# Patient Record
Sex: MALE | Race: WHITE | NOT HISPANIC OR LATINO | Employment: FULL TIME | URBAN - METROPOLITAN AREA
[De-identification: names, ages, dates, MRNs, and addresses within clinical notes are randomized per-mention and may not be internally consistent; named-entity substitution may affect disease eponyms.]

---

## 2017-01-10 ENCOUNTER — OFFICE VISIT (OUTPATIENT)
Dept: FAMILY MEDICINE CLINIC | Facility: CLINIC | Age: 72
End: 2017-01-10

## 2017-01-10 ENCOUNTER — HOSPITAL ENCOUNTER (OUTPATIENT)
Dept: GENERAL RADIOLOGY | Facility: HOSPITAL | Age: 72
Discharge: HOME | End: 2017-01-10
Admitting: INTERNAL MEDICINE

## 2017-01-10 VITALS
RESPIRATION RATE: 18 BRPM | DIASTOLIC BLOOD PRESSURE: 80 MMHG | WEIGHT: 233 LBS | HEART RATE: 66 BPM | BODY MASS INDEX: 32.62 KG/M2 | SYSTOLIC BLOOD PRESSURE: 156 MMHG | HEIGHT: 71 IN

## 2017-01-10 DIAGNOSIS — R03.0 ELEVATED BLOOD PRESSURE READING WITHOUT DIAGNOSIS OF HYPERTENSION: ICD-10-CM

## 2017-01-10 DIAGNOSIS — G89.29 CHRONIC PAIN OF RIGHT HIP: ICD-10-CM

## 2017-01-10 DIAGNOSIS — M25.551 CHRONIC PAIN OF RIGHT HIP: Primary | ICD-10-CM

## 2017-01-10 DIAGNOSIS — G89.29 CHRONIC PAIN OF RIGHT HIP: Primary | ICD-10-CM

## 2017-01-10 DIAGNOSIS — M25.551 CHRONIC PAIN OF RIGHT HIP: ICD-10-CM

## 2017-01-10 PROBLEM — R53.83 FATIGUE: Status: ACTIVE | Noted: 2017-01-10

## 2017-01-10 PROBLEM — N52.9 IMPOTENCE OF ORGANIC ORIGIN: Status: ACTIVE | Noted: 2017-01-10

## 2017-01-10 PROCEDURE — 99203 OFFICE O/P NEW LOW 30 MIN: CPT | Performed by: INTERNAL MEDICINE

## 2017-01-10 PROCEDURE — 73502 X-RAY EXAM HIP UNI 2-3 VIEWS: CPT

## 2017-01-10 NOTE — MR AVS SNAPSHOT
"Yasir Sheikh   1/10/2017 2:00 PM   Office Visit    Dept Phone:  [phone]   Encounter #:  [id number]    Provider:  Han Cook MD   Department:  [facility]                Your Full Care Plan              Your Updated Medication List      Notice  As of 1/10/2017  2:25 PM    You have not been prescribed any medications.            We Performed the Following     XR Hip 1 View Bilateral       You Were Diagnosed With        Codes Comments    Chronic pain of right hip    -  Primary ICD-10-CM: M25.551, G89.29  ICD-9-CM: 719.45, 338.29     Elevated blood pressure reading without diagnosis of hypertension     ICD-10-CM: R03.0  ICD-9-CM: 796.2       Instructions     None    Patient Instructions History      Upcoming Appointments     Visit Type Date Time Department    NEW PATIENT 1/10/2017  2:00 PM [facility]      [facility] Signup     Our records indicate that you have declined [facility] signup. If you would like to sign up for [employer], please email [email or web address] or call [phone] to obtain an activation code.             Other Info from Your Visit           Allergies     No Known Allergies      Reason for Visit     Hip Pain right hip x 2 years       Vital Signs     Blood Pressure Pulse Respirations Height Weight Body Mass Index    156/80 66 18 71\" (180.3 cm) 233 lb (106 kg) 32.5 kg/m2    Smoking Status                   Former Smoker           Problems and Diagnoses Noted     Chronic pain of right hip    Colon polyp    Elevated blood pressure    Tiredness    High cholesterol    Failure of erection    Other shoulder lesions, unspecified shoulder        "

## 2017-01-10 NOTE — PATIENT INSTRUCTIONS
We'll obtain a right hip x-ray.  He need to monitor your blood pressure correctly and record the results.  Schedule fasting labs including a CBC, CMP, lipid, TSH.  Also suggest vascular screening tests.  Follow-up in one month ago over the results.

## 2017-01-10 NOTE — PROGRESS NOTES
Subjective   Yasir Hector is a 71 y.o. male. Patient is here today for   Chief Complaint   Patient presents with   • Hip Pain     right hip x 2 years           Vitals:    01/10/17 1344   BP: 156/80   Pulse: 66   Resp: 18     The following portions of the patient's history were reviewed and updated as appropriate: allergies, current medications, past family history, past medical history, past social history, past surgical history and problem list.    History reviewed. No pertinent past medical history.   No Known Allergies   Social History     Social History   • Marital status:      Spouse name: N/A   • Number of children: N/A   • Years of education: N/A     Occupational History   • Not on file.     Social History Main Topics   • Smoking status: Former Smoker     Types: Cigars   • Smokeless tobacco: Not on file   • Alcohol use Yes   • Drug use: Not on file   • Sexual activity: Not on file     Other Topics Concern   • Not on file     Social History Narrative   • No narrative on file      No current outpatient prescriptions on file.     Objective     History of Present Illness Yasir is here today as a new patient.  He complains of chronic right hip pain that flares up when he walks for a period time.  He stops for a while and goes away and he resumes his walk.  He walks up 12 flights a step at work and does not have any hip pain.  He does not take any medicine for the pain.  He does not have pain at night.  His previous physician was in [facility].  Review of records reveals that he has hyperlipidemia that is not being treated.  His last lab work was in 2012 and does not include a cholesterol level.  He also has a history of colon polyp.  He checks his blood pressure at home every couple weeks and states his systolic blood pressures in the 120s.    Review of Systems   Constitutional: Negative for activity change and unexpected weight change.   Cardiovascular:        States BP is normal at home    Gastrointestinal:        Hx colon polyps   Musculoskeletal:        As in history of present illness   Neurological: Negative.    Psychiatric/Behavioral: Negative.        Physical Exam   Constitutional: He appears well-developed and well-nourished.   Neck: Neck supple. Carotid bruit is not present.   Cardiovascular: Normal rate and regular rhythm.  Frequent extrasystoles are present.   Pulmonary/Chest: Effort normal and breath sounds normal.   Musculoskeletal:   Right hip has normal range of motion.  There is slight point tenderness   Neurological: He is alert.   Psychiatric: He has a normal mood and affect.   Vitals reviewed.      ASSESSMENT     Problem List Items Addressed This Visit        Nervous and Auditory    Chronic pain of right hip - Primary    Relevant Orders    XR Hip With or Without Pelvis 2 - 3 View Right       Other    Elevated blood pressure reading without diagnosis of hypertension          PLAN  Patient Instructions   We'll obtain a right hip x-ray.  He need to monitor your blood pressure correctly and record the results.  Schedule fasting labs including a CBC, CMP, lipid, TSH.  Also suggest vascular screening tests.  Follow-up in one month ago over the results.    Return in about 1 month (around 2/10/2017) for labsCBC,CMP,LIPID,TSH.

## 2017-01-13 ENCOUNTER — TRANSCRIBE ORDERS (OUTPATIENT)
Dept: ADMINISTRATIVE | Facility: HOSPITAL | Age: 72
End: 2017-01-13

## 2017-01-13 ENCOUNTER — TELEPHONE (OUTPATIENT)
Dept: FAMILY MEDICINE CLINIC | Facility: CLINIC | Age: 72
End: 2017-01-13

## 2017-01-13 DIAGNOSIS — Z13.9 SCREENING: Primary | ICD-10-CM

## 2017-01-13 NOTE — TELEPHONE ENCOUNTER
I called patient and notified him, per Dr. Hernández, that his X-Rays just showed some arthritis in both hips.   Patient understood and was thankful for Dr. Cook's care.

## 2017-01-17 ENCOUNTER — HOSPITAL ENCOUNTER (OUTPATIENT)
Dept: CARDIOLOGY | Facility: HOSPITAL | Age: 72
Discharge: HOME | End: 2017-01-17
Admitting: INTERNAL MEDICINE

## 2017-01-17 VITALS
DIASTOLIC BLOOD PRESSURE: 87 MMHG | HEIGHT: 70 IN | BODY MASS INDEX: 32.93 KG/M2 | HEART RATE: 56 BPM | SYSTOLIC BLOOD PRESSURE: 136 MMHG | WEIGHT: 230 LBS

## 2017-01-17 DIAGNOSIS — Z13.9 SCREENING: ICD-10-CM

## 2017-01-17 LAB
BH CV ECHO MEAS - DIST AO DIAM: 1.39 CM
BH CV VAS BP LEFT ARM: NORMAL MMHG
BH CV VAS BP RIGHT ARM: NORMAL MMHG
BH CV XLRA MEAS - MID AO DIAM: 1.69 CM
BH CV XLRA MEAS - PAD LEFT ABI DP: 1.05
BH CV XLRA MEAS - PAD LEFT ABI PT: 1.04
BH CV XLRA MEAS - PAD LEFT ARM: 133 MMHG
BH CV XLRA MEAS - PAD LEFT LEG DP: 144 MMHG
BH CV XLRA MEAS - PAD LEFT LEG PT: 142 MMHG
BH CV XLRA MEAS - PAD RIGHT ABI DP: 1.04
BH CV XLRA MEAS - PAD RIGHT ABI PT: 1.02
BH CV XLRA MEAS - PAD RIGHT ARM: 136 MMHG
BH CV XLRA MEAS - PAD RIGHT LEG DP: 142 MMHG
BH CV XLRA MEAS - PAD RIGHT LEG PT: 140 MMHG
BH CV XLRA MEAS - PROX AO DIAM: 1.85 CM
BH CV XLRA MEAS LEFT ICA/CCA RATIO: 1.44
BH CV XLRA MEAS LEFT MID CCA PSV: NORMAL CM/SEC
BH CV XLRA MEAS LEFT MID ICA PSV: NORMAL CM/SEC
BH CV XLRA MEAS LEFT PROX ECA PSV: NORMAL CM/SEC
BH CV XLRA MEAS RIGHT ICA/CCA RATIO: 1.77
BH CV XLRA MEAS RIGHT MID CCA PSV: NORMAL CM/SEC
BH CV XLRA MEAS RIGHT MID ICA PSV: NORMAL CM/SEC
BH CV XLRA MEAS RIGHT PROX ECA PSV: NORMAL CM/SEC

## 2017-01-17 PROCEDURE — 93799 UNLISTED CV SVC/PROCEDURE: CPT

## 2017-02-09 DIAGNOSIS — E78.00 HYPERCHOLESTEROLEMIA: Primary | ICD-10-CM

## 2017-02-09 DIAGNOSIS — I10 ESSENTIAL HYPERTENSION: ICD-10-CM

## 2017-02-14 LAB
ALBUMIN SERPL-MCNC: 4.6 G/DL (ref 3.5–5.2)
ALBUMIN/GLOB SERPL: 1.9 G/DL
ALP SERPL-CCNC: 63 U/L (ref 39–117)
ALT SERPL-CCNC: 30 U/L (ref 1–41)
AST SERPL-CCNC: 24 U/L (ref 1–40)
BASOPHILS # BLD AUTO: 0.02 10*3/MM3 (ref 0–0.2)
BASOPHILS NFR BLD AUTO: 0.3 % (ref 0–1.5)
BILIRUB SERPL-MCNC: 0.7 MG/DL (ref 0.1–1.2)
BUN SERPL-MCNC: 15 MG/DL (ref 8–23)
BUN/CREAT SERPL: 14.9 (ref 7–25)
CALCIUM SERPL-MCNC: 9.7 MG/DL (ref 8.6–10.5)
CHLORIDE SERPL-SCNC: 101 MMOL/L (ref 98–107)
CHOLEST SERPL-MCNC: 250 MG/DL (ref 0–200)
CO2 SERPL-SCNC: 28.9 MMOL/L (ref 22–29)
CREAT SERPL-MCNC: 1.01 MG/DL (ref 0.76–1.27)
EOSINOPHIL # BLD AUTO: 0.29 10*3/MM3 (ref 0–0.7)
EOSINOPHIL NFR BLD AUTO: 4.6 % (ref 0.3–6.2)
ERYTHROCYTE [DISTWIDTH] IN BLOOD BY AUTOMATED COUNT: 14.1 % (ref 11.5–14.5)
GLOBULIN SER CALC-MCNC: 2.4 GM/DL
GLUCOSE SERPL-MCNC: 118 MG/DL (ref 65–99)
HCT VFR BLD AUTO: 45.3 % (ref 40.4–52.2)
HDLC SERPL-MCNC: 67 MG/DL (ref 40–60)
HGB BLD-MCNC: 14.8 G/DL (ref 13.7–17.6)
IMM GRANULOCYTES # BLD: 0 10*3/MM3 (ref 0–0.03)
IMM GRANULOCYTES NFR BLD: 0 % (ref 0–0.5)
LDLC SERPL CALC-MCNC: 164 MG/DL (ref 0–100)
LDLC/HDLC SERPL: 2.45 {RATIO}
LYMPHOCYTES # BLD AUTO: 2.54 10*3/MM3 (ref 0.9–4.8)
LYMPHOCYTES NFR BLD AUTO: 40.7 % (ref 19.6–45.3)
MCH RBC QN AUTO: 31.8 PG (ref 27–32.7)
MCHC RBC AUTO-ENTMCNC: 32.7 G/DL (ref 32.6–36.4)
MCV RBC AUTO: 97.2 FL (ref 79.8–96.2)
MONOCYTES # BLD AUTO: 0.58 10*3/MM3 (ref 0.2–1.2)
MONOCYTES NFR BLD AUTO: 9.3 % (ref 5–12)
NEUTROPHILS # BLD AUTO: 2.81 10*3/MM3 (ref 1.9–8.1)
NEUTROPHILS NFR BLD AUTO: 45.1 % (ref 42.7–76)
PLATELET # BLD AUTO: 190 10*3/MM3 (ref 140–500)
POTASSIUM SERPL-SCNC: 5.2 MMOL/L (ref 3.5–5.2)
PROT SERPL-MCNC: 7 G/DL (ref 6–8.5)
RBC # BLD AUTO: 4.66 10*6/MM3 (ref 4.6–6)
SODIUM SERPL-SCNC: 142 MMOL/L (ref 136–145)
TRIGL SERPL-MCNC: 93 MG/DL (ref 0–150)
TSH SERPL DL<=0.005 MIU/L-ACNC: 3.04 MIU/ML (ref 0.27–4.2)
VLDLC SERPL CALC-MCNC: 18.6 MG/DL (ref 5–40)
WBC # BLD AUTO: 6.24 10*3/MM3 (ref 4.5–10.7)

## 2017-02-21 ENCOUNTER — OFFICE VISIT (OUTPATIENT)
Dept: FAMILY MEDICINE CLINIC | Facility: CLINIC | Age: 72
End: 2017-02-21

## 2017-02-21 VITALS
RESPIRATION RATE: 18 BRPM | HEIGHT: 70 IN | SYSTOLIC BLOOD PRESSURE: 172 MMHG | HEART RATE: 66 BPM | DIASTOLIC BLOOD PRESSURE: 74 MMHG | WEIGHT: 234 LBS | BODY MASS INDEX: 33.5 KG/M2

## 2017-02-21 DIAGNOSIS — E78.00 HYPERCHOLESTEROLEMIA: Primary | ICD-10-CM

## 2017-02-21 DIAGNOSIS — I10 ESSENTIAL HYPERTENSION: ICD-10-CM

## 2017-02-21 DIAGNOSIS — I77.9 BILATERAL CAROTID ARTERY DISEASE (HCC): ICD-10-CM

## 2017-02-21 PROCEDURE — 99214 OFFICE O/P EST MOD 30 MIN: CPT | Performed by: INTERNAL MEDICINE

## 2017-02-21 RX ORDER — LISINOPRIL 10 MG/1
10 TABLET ORAL DAILY
Qty: 30 TABLET | Refills: 5 | Status: SHIPPED | OUTPATIENT
Start: 2017-02-21 | End: 2017-12-14

## 2017-02-21 RX ORDER — ROSUVASTATIN CALCIUM 20 MG/1
20 TABLET, COATED ORAL DAILY
Qty: 30 TABLET | Refills: 5 | Status: SHIPPED | OUTPATIENT
Start: 2017-02-21 | End: 2017-06-15

## 2017-02-21 NOTE — PATIENT INSTRUCTIONS
Your blood pressures in the hypertensive range.  Start lisinopril 10 mg daily and continue to monitor your blood pressure.  Goal is less than 130/80.  Call if your blood pressure remains elevated we will adjust your dose of lisinopril.  Your total and LDL cholesterol elevated.  Start rosuvastatin 20 mg daily.  Also discussed diet, exercise, weight loss per AHA guidelines as well as the results of your vascular screening tests.  Also suggest that she start aspirin 81 mg daily.  Follow-up in 3 months with labs.

## 2017-02-21 NOTE — PROGRESS NOTES
Subjective   Yasir Hector is a 72 y.o. male. Patient is here today for   Chief Complaint   Patient presents with   • Hyperlipidemia   • Hypertension          Vitals:    02/21/17 1557   BP: 172/74   Pulse: 66   Resp: 18       The following portions of the patient's history were reviewed and updated as appropriate: allergies, current medications, past family history, past medical history, past social history, past surgical history and problem list.    History reviewed. No pertinent past medical history.   No Known Allergies   Social History     Social History   • Marital status:      Spouse name: N/A   • Number of children: N/A   • Years of education: N/A     Occupational History   • Not on file.     Social History Main Topics   • Smoking status: Former Smoker     Types: Cigars   • Smokeless tobacco: Not on file   • Alcohol use Yes   • Drug use: Not on file   • Sexual activity: Not on file     Other Topics Concern   • Not on file     Social History Narrative        Current Outpatient Prescriptions:   •  lisinopril (PRINIVIL,ZESTRIL) 10 MG tablet, Take 1 tablet by mouth Daily., Disp: 30 tablet, Rfl: 5  •  rosuvastatin (CRESTOR) 20 MG tablet, Take 1 tablet by mouth Daily., Disp: 30 tablet, Rfl: 5     Objective   History of Present Illness Yasir is here today for a blood pressure and lab follow-up.  He was seen one month ago with complaints of intermittent hip pain.  He was noted to have elevated blood pressure.  He also has a prior history of hypercholesterolemia.  He has been monitoring his blood pressure and reports average systolic readings a little bit over 140.  He had vascular screening tests which showed moderate plaque in his right internal carotid artery and mild plaque in his left.    Review of Systems   Constitutional: Negative.    Respiratory: Negative for shortness of breath.    Cardiovascular: Negative for chest pain and leg swelling.   Neurological: Negative for light-headedness and headaches.        Physical Exam   Constitutional: He appears well-developed and well-nourished.   Neck: Carotid bruit is not present.   Cardiovascular: Normal rate, regular rhythm and normal heart sounds.    160/80 x 2   Musculoskeletal: He exhibits no edema.   Psychiatric: He has a normal mood and affect.   Vitals reviewed.      ASSESSMENT     Problem List Items Addressed This Visit        Cardiovascular and Mediastinum    Hypercholesterolemia - Primary    Relevant Medications    rosuvastatin (CRESTOR) 20 MG tablet    Essential hypertension    Relevant Medications    lisinopril (PRINIVIL,ZESTRIL) 10 MG tablet    Bilateral carotid artery disease          PLAN  Patient Instructions   Your blood pressures in the hypertensive range.  Start lisinopril 10 mg daily and continue to monitor your blood pressure.  Goal is less than 130/80.  Call if your blood pressure remains elevated we will adjust your dose of lisinopril.  Your total and LDL cholesterol elevated.  Start rosuvastatin 20 mg daily.  Also discussed diet, exercise, weight loss per AHA guidelines as well as the results of your vascular screening tests.  Also suggest that she start aspirin 81 mg daily.  Follow-up in 3 months with labs.      Return in about 3 months (around 5/21/2017) for labs CBC,CMP,NMR LP,UA,TSH,EKG.

## 2017-05-19 DIAGNOSIS — I10 ESSENTIAL HYPERTENSION: ICD-10-CM

## 2017-05-19 DIAGNOSIS — E78.00 HYPERCHOLESTEROLEMIA: ICD-10-CM

## 2017-05-26 LAB
ALBUMIN SERPL-MCNC: 4.4 G/DL (ref 3.5–5.2)
ALBUMIN/GLOB SERPL: 2 G/DL
ALP SERPL-CCNC: 68 U/L (ref 39–117)
ALT SERPL-CCNC: 20 U/L (ref 1–41)
APPEARANCE UR: CLEAR
AST SERPL-CCNC: 20 U/L (ref 1–40)
BACTERIA #/AREA URNS HPF: NORMAL /HPF
BASOPHILS # BLD AUTO: 0.02 10*3/MM3 (ref 0–0.2)
BASOPHILS NFR BLD AUTO: 0.3 % (ref 0–1.5)
BILIRUB SERPL-MCNC: 0.9 MG/DL (ref 0.1–1.2)
BILIRUB UR QL STRIP: NEGATIVE
BUN SERPL-MCNC: 11 MG/DL (ref 8–23)
BUN/CREAT SERPL: 11.3 (ref 7–25)
CALCIUM SERPL-MCNC: 9.4 MG/DL (ref 8.6–10.5)
CASTS URNS MICRO: NORMAL
CHLORIDE SERPL-SCNC: 98 MMOL/L (ref 98–107)
CHOLEST SERPL-MCNC: 208 MG/DL (ref 100–199)
CO2 SERPL-SCNC: 27.2 MMOL/L (ref 22–29)
COLOR UR: YELLOW
CREAT SERPL-MCNC: 0.97 MG/DL (ref 0.76–1.27)
EOSINOPHIL # BLD AUTO: 0.36 10*3/MM3 (ref 0–0.7)
EOSINOPHIL NFR BLD AUTO: 5.5 % (ref 0.3–6.2)
EPI CELLS #/AREA URNS HPF: NORMAL /HPF
ERYTHROCYTE [DISTWIDTH] IN BLOOD BY AUTOMATED COUNT: 14.5 % (ref 11.5–14.5)
GLOBULIN SER CALC-MCNC: 2.2 GM/DL
GLUCOSE SERPL-MCNC: 111 MG/DL (ref 65–99)
GLUCOSE UR QL: NEGATIVE
HCT VFR BLD AUTO: 44.4 % (ref 40.4–52.2)
HDL SERPL-SCNC: 34.2 UMOL/L
HDLC SERPL-MCNC: 58 MG/DL
HGB BLD-MCNC: 14.4 G/DL (ref 13.7–17.6)
HGB UR QL STRIP: NEGATIVE
IMM GRANULOCYTES # BLD: 0.02 10*3/MM3 (ref 0–0.03)
IMM GRANULOCYTES NFR BLD: 0.3 % (ref 0–0.5)
KETONES UR QL STRIP: NEGATIVE
LDL SERPL QN: 20.9 NM
LDL SERPL-SCNC: 1708 NMOL/L
LDL SMALL SERPL-SCNC: 373 NMOL/L
LDLC SERPL CALC-MCNC: 132 MG/DL (ref 0–99)
LEUKOCYTE ESTERASE UR QL STRIP: NEGATIVE
LYMPHOCYTES # BLD AUTO: 2.13 10*3/MM3 (ref 0.9–4.8)
LYMPHOCYTES NFR BLD AUTO: 32.4 % (ref 19.6–45.3)
MCH RBC QN AUTO: 31.5 PG (ref 27–32.7)
MCHC RBC AUTO-ENTMCNC: 32.4 G/DL (ref 32.6–36.4)
MCV RBC AUTO: 97.2 FL (ref 79.8–96.2)
MONOCYTES # BLD AUTO: 0.66 10*3/MM3 (ref 0.2–1.2)
MONOCYTES NFR BLD AUTO: 10 % (ref 5–12)
NEUTROPHILS # BLD AUTO: 3.38 10*3/MM3 (ref 1.9–8.1)
NEUTROPHILS NFR BLD AUTO: 51.5 % (ref 42.7–76)
NITRITE UR QL STRIP: NEGATIVE
PH UR STRIP: 6.5 [PH] (ref 5–8)
PLATELET # BLD AUTO: 197 10*3/MM3 (ref 140–500)
POTASSIUM SERPL-SCNC: 4.9 MMOL/L (ref 3.5–5.2)
PROT SERPL-MCNC: 6.6 G/DL (ref 6–8.5)
PROT UR QL STRIP: NEGATIVE
RBC # BLD AUTO: 4.57 10*6/MM3 (ref 4.6–6)
RBC #/AREA URNS HPF: NORMAL /HPF
SODIUM SERPL-SCNC: 137 MMOL/L (ref 136–145)
SP GR UR: 1.02 (ref 1–1.03)
TRIGL SERPL-MCNC: 91 MG/DL (ref 0–149)
TSH SERPL DL<=0.005 MIU/L-ACNC: 3.47 MIU/ML (ref 0.27–4.2)
UROBILINOGEN UR STRIP-MCNC: (no result) MG/DL
WBC # BLD AUTO: 6.57 10*3/MM3 (ref 4.5–10.7)
WBC #/AREA URNS HPF: NORMAL /HPF

## 2017-06-15 ENCOUNTER — OFFICE VISIT (OUTPATIENT)
Dept: FAMILY MEDICINE CLINIC | Facility: CLINIC | Age: 72
End: 2017-06-15

## 2017-06-15 VITALS
WEIGHT: 232.6 LBS | SYSTOLIC BLOOD PRESSURE: 122 MMHG | HEIGHT: 70 IN | RESPIRATION RATE: 18 BRPM | DIASTOLIC BLOOD PRESSURE: 70 MMHG | HEART RATE: 68 BPM | BODY MASS INDEX: 33.3 KG/M2

## 2017-06-15 DIAGNOSIS — E78.00 HYPERCHOLESTEROLEMIA: ICD-10-CM

## 2017-06-15 DIAGNOSIS — I77.9 BILATERAL CAROTID ARTERY DISEASE (HCC): ICD-10-CM

## 2017-06-15 DIAGNOSIS — I10 ESSENTIAL HYPERTENSION: Primary | ICD-10-CM

## 2017-06-15 PROBLEM — R03.0 ELEVATED BLOOD PRESSURE READING WITHOUT DIAGNOSIS OF HYPERTENSION: Status: RESOLVED | Noted: 2017-01-10 | Resolved: 2017-06-15

## 2017-06-15 PROCEDURE — 99214 OFFICE O/P EST MOD 30 MIN: CPT | Performed by: INTERNAL MEDICINE

## 2017-06-15 RX ORDER — ROSUVASTATIN CALCIUM 40 MG/1
40 TABLET, COATED ORAL DAILY
Qty: 90 TABLET | Refills: 3 | Status: SHIPPED | OUTPATIENT
Start: 2017-06-15 | End: 2020-09-18

## 2017-06-15 NOTE — PROGRESS NOTES
Subjective   Yasir Hector is a 72 y.o. male. Patient is here today for   Chief Complaint   Patient presents with   • Hyperlipidemia   • Hypertension          Vitals:    06/15/17 1537   BP: 122/70   Pulse: 68   Resp: 18       The following portions of the patient's history were reviewed and updated as appropriate: allergies, current medications, past family history, past medical history, past social history, past surgical history and problem list.    History reviewed. No pertinent past medical history.   No Known Allergies   Social History     Social History   • Marital status:      Spouse name: N/A   • Number of children: N/A   • Years of education: N/A     Occupational History   • Not on file.     Social History Main Topics   • Smoking status: Former Smoker     Types: Cigars   • Smokeless tobacco: Not on file   • Alcohol use Yes   • Drug use: Not on file   • Sexual activity: Not on file     Other Topics Concern   • Not on file     Social History Narrative        Current Outpatient Prescriptions:   •  lisinopril (PRINIVIL,ZESTRIL) 10 MG tablet, Take 1 tablet by mouth Daily., Disp: 30 tablet, Rfl: 5  •  rosuvastatin (CRESTOR) 40 MG tablet, Take 1 tablet by mouth Daily., Disp: 90 tablet, Rfl: 3     Objective   History of Present Illness Yasir is here today for a blood pressure and lab follow-up.  He has hypertension, carotid artery disease, and hyperlipidemia.  He has been monitoring his blood pressure at home and reports stable readings.  He had a syncopal episode in March and went to the emergency room.  He was diagnosed with influenza B.  He did have a cardiac and neurologic evaluation which revealed carotid artery disease that was moderate on the left and mild on the right.  He also had a carotid Doppler in January 2017 which showed the same.  He eats healthy and walks on a daily basis.    Review of Systems   Constitutional: Negative for activity change and unexpected weight change.   Respiratory:  Negative.    Cardiovascular: Negative for chest pain and leg swelling.   Neurological: Negative for light-headedness.   Psychiatric/Behavioral: Negative.        Physical Exam   Constitutional: He appears well-developed and well-nourished.   Neck: Carotid bruit is not present.   Cardiovascular: Normal rate, regular rhythm and normal heart sounds.    Pulmonary/Chest: Effort normal and breath sounds normal.   Vitals reviewed.      ASSESSMENT     Problem List Items Addressed This Visit        Cardiovascular and Mediastinum    Hypercholesterolemia    Relevant Medications    rosuvastatin (CRESTOR) 40 MG tablet    Essential hypertension - Primary    Bilateral carotid artery disease          PLAN  Patient Instructions   Your blood pressure is stable.  Total cholesterol is 208 with an HDL cholesterol of 58 and LDL cholesterol of 132.  Will increase rosuvastatin to 40 mg daily.  Thyroid-stimulating hormone level is normal.  Fasting blood sugar is mildly elevated at 111.  Discussed carotid artery disease.  We'll plan on repeating a carotid Doppler annually.      Return in about 6 months (around 12/15/2017) for labs CMP,A1C,NMR LP.

## 2017-06-15 NOTE — PATIENT INSTRUCTIONS
Your blood pressure is stable.  Total cholesterol is 208 with an HDL cholesterol of 58 and LDL cholesterol of 132.  Will increase rosuvastatin to 40 mg daily.  Thyroid-stimulating hormone level is normal.  Fasting blood sugar is mildly elevated at 111.  Discussed carotid artery disease.  We'll plan on repeating a carotid Doppler annually.

## 2017-12-05 DIAGNOSIS — R73.01 ELEVATED FASTING GLUCOSE: Primary | ICD-10-CM

## 2017-12-05 DIAGNOSIS — I10 ESSENTIAL HYPERTENSION: ICD-10-CM

## 2017-12-05 DIAGNOSIS — E78.00 HYPERCHOLESTEROLEMIA: ICD-10-CM

## 2017-12-09 LAB
ALBUMIN SERPL-MCNC: 4.3 G/DL (ref 3.5–5.2)
ALBUMIN/GLOB SERPL: 1.5 G/DL
ALP SERPL-CCNC: 76 U/L (ref 39–117)
ALT SERPL-CCNC: 26 U/L (ref 1–41)
AST SERPL-CCNC: 25 U/L (ref 1–40)
BILIRUB SERPL-MCNC: 0.8 MG/DL (ref 0.1–1.2)
BUN SERPL-MCNC: 10 MG/DL (ref 8–23)
BUN/CREAT SERPL: 9.9 (ref 7–25)
CALCIUM SERPL-MCNC: 9.6 MG/DL (ref 8.6–10.5)
CHLORIDE SERPL-SCNC: 101 MMOL/L (ref 98–107)
CHOLEST SERPL-MCNC: 245 MG/DL (ref 100–199)
CO2 SERPL-SCNC: 26.8 MMOL/L (ref 22–29)
CREAT SERPL-MCNC: 1.01 MG/DL (ref 0.76–1.27)
GFR SERPLBLD CREATININE-BSD FMLA CKD-EPI: 72 ML/MIN/1.73
GFR SERPLBLD CREATININE-BSD FMLA CKD-EPI: 88 ML/MIN/1.73
GLOBULIN SER CALC-MCNC: 2.9 GM/DL
GLUCOSE SERPL-MCNC: 119 MG/DL (ref 65–99)
HBA1C MFR BLD: 5.8 % (ref 4.8–5.6)
HDL SERPL-SCNC: 30.6 UMOL/L
HDLC SERPL-MCNC: 57 MG/DL
LDL SERPL QN: 21.1 NM
LDL SERPL-SCNC: 2015 NMOL/L
LDL SMALL SERPL-SCNC: 513 NMOL/L
LDLC SERPL CALC-MCNC: 166 MG/DL (ref 0–99)
POTASSIUM SERPL-SCNC: 5 MMOL/L (ref 3.5–5.2)
PROT SERPL-MCNC: 7.2 G/DL (ref 6–8.5)
SODIUM SERPL-SCNC: 141 MMOL/L (ref 136–145)
TRIGL SERPL-MCNC: 111 MG/DL (ref 0–149)

## 2017-12-14 ENCOUNTER — OFFICE VISIT (OUTPATIENT)
Dept: FAMILY MEDICINE CLINIC | Facility: CLINIC | Age: 72
End: 2017-12-14

## 2017-12-14 VITALS
HEIGHT: 70 IN | DIASTOLIC BLOOD PRESSURE: 80 MMHG | RESPIRATION RATE: 18 BRPM | SYSTOLIC BLOOD PRESSURE: 136 MMHG | HEART RATE: 62 BPM | BODY MASS INDEX: 34.07 KG/M2 | WEIGHT: 238 LBS

## 2017-12-14 DIAGNOSIS — E78.00 HYPERCHOLESTEROLEMIA: ICD-10-CM

## 2017-12-14 DIAGNOSIS — I10 ESSENTIAL HYPERTENSION: Primary | ICD-10-CM

## 2017-12-14 PROBLEM — R73.03 PREDIABETES: Status: ACTIVE | Noted: 2017-12-14

## 2017-12-14 PROCEDURE — 99214 OFFICE O/P EST MOD 30 MIN: CPT | Performed by: INTERNAL MEDICINE

## 2017-12-14 RX ORDER — LISINOPRIL 20 MG/1
20 TABLET ORAL DAILY
Qty: 30 TABLET | Refills: 5 | Status: SHIPPED | OUTPATIENT
Start: 2017-12-14 | End: 2020-09-18

## 2017-12-14 RX ORDER — EZETIMIBE 10 MG/1
10 TABLET ORAL DAILY
Qty: 30 TABLET | Refills: 5 | Status: SHIPPED | OUTPATIENT
Start: 2017-12-14 | End: 2020-09-18

## 2017-12-14 NOTE — PROGRESS NOTES
Subjective   Yasir Hector is a 72 y.o. male. Patient is here today for   Chief Complaint   Patient presents with   • Hyperglycemia   • Hyperlipidemia   • Hypertension          Vitals:    12/14/17 1536   BP: 136/80   Pulse: 62   Resp: 18     The following portions of the patient's history were reviewed and updated as appropriate: allergies, current medications, past family history, past medical history, past social history, past surgical history and problem list.    No past medical history on file.   No Known Allergies   Social History     Social History   • Marital status:      Spouse name: N/A   • Number of children: N/A   • Years of education: N/A     Occupational History   • Not on file.     Social History Main Topics   • Smoking status: Former Smoker     Types: Cigars   • Smokeless tobacco: Not on file   • Alcohol use Yes   • Drug use: Not on file   • Sexual activity: Not on file     Other Topics Concern   • Not on file     Social History Narrative        Current Outpatient Prescriptions:   •  rosuvastatin (CRESTOR) 40 MG tablet, Take 1 tablet by mouth Daily., Disp: 90 tablet, Rfl: 3  •  ezetimibe (ZETIA) 10 MG tablet, Take 1 tablet by mouth Daily., Disp: 30 tablet, Rfl: 5  •  lisinopril (PRINIVIL,ZESTRIL) 20 MG tablet, Take 1 tablet by mouth Daily., Disp: 30 tablet, Rfl: 5     Objective     History of Present Illness Yasir is here today for a blood pressure and lab follow-up.  He has hypertension, although they fasting blood sugar, and hyperlipidemia and carotid artery disease.  He had vascular screening earlier this year.  He has gained some weight in the last 6 months as he states that he eats too much.  He does monitor his blood pressure reports systolic readings in the 130s.  He is physically active but does not exercise.    Review of Systems   Constitutional: Negative for activity change.        Weight increase 6 lbs   Respiratory: Negative.    Cardiovascular:        BP mid 130/80    Gastrointestinal: Negative.    Genitourinary: Negative.        Physical Exam   Constitutional: He appears well-developed and well-nourished.   Neck: Carotid bruit is not present.   Cardiovascular: Normal rate, regular rhythm and normal heart sounds.    Pulmonary/Chest: Effort normal and breath sounds normal.   Musculoskeletal: He exhibits no edema.   Neurological: He is alert.   Psychiatric: He has a normal mood and affect.   Vitals reviewed.      ASSESSMENT     Problem List Items Addressed This Visit        Cardiovascular and Mediastinum    Hypercholesterolemia    Relevant Medications    ezetimibe (ZETIA) 10 MG tablet    Essential hypertension - Primary    Relevant Medications    lisinopril (PRINIVIL,ZESTRIL) 20 MG tablet          PLAN  Patient Instructions   Blood pressure is not at goal.  We'll increase lisinopril to 20 mg daily.  Continue  to monitor your blood pressure.  Fasting blood sugar is elevated at 119 and hemoglobin A1c is 5.8 which is prediabetes.  Total cholesterol is elevated at 245.  HDL is 57 and LDL is high at 166.  Discussed diet, exercise, and weight loss.  Will add Zetia 10 mg daily and continue rosuvastatin 40 mg daily.    Return in about 3 months (around 3/14/2018) for labsCMP,LIPID,A1C.

## 2017-12-14 NOTE — PATIENT INSTRUCTIONS
Blood pressure is not at goal.  We'll increase lisinopril to 20 mg daily.  Continue  to monitor your blood pressure.  Fasting blood sugar is elevated at 119 and hemoglobin A1c is 5.8 which is prediabetes.  Total cholesterol is elevated at 245.  HDL is 57 and LDL is high at 166.  Discussed diet, exercise, and weight loss.  Will add Zetia 10 mg daily and continue rosuvastatin 40 mg daily.

## 2020-09-18 ENCOUNTER — OFFICE VISIT (OUTPATIENT)
Dept: CARDIOLOGY | Facility: CLINIC | Age: 75
End: 2020-09-18

## 2020-09-18 VITALS
OXYGEN SATURATION: 92 % | DIASTOLIC BLOOD PRESSURE: 83 MMHG | SYSTOLIC BLOOD PRESSURE: 164 MMHG | HEART RATE: 74 BPM | WEIGHT: 247 LBS | BODY MASS INDEX: 35.44 KG/M2

## 2020-09-18 DIAGNOSIS — I10 ESSENTIAL HYPERTENSION: Primary | ICD-10-CM

## 2020-09-18 DIAGNOSIS — R07.89 OTHER CHEST PAIN: ICD-10-CM

## 2020-09-18 DIAGNOSIS — E78.00 HYPERCHOLESTEROLEMIA: ICD-10-CM

## 2020-09-18 DIAGNOSIS — R93.1 ABNORMAL SCREENING CARDIAC CT: ICD-10-CM

## 2020-09-18 DIAGNOSIS — F10.10 ALCOHOL ABUSE: ICD-10-CM

## 2020-09-18 PROCEDURE — 99205 OFFICE O/P NEW HI 60 MIN: CPT | Performed by: INTERNAL MEDICINE

## 2020-09-18 PROCEDURE — 93000 ELECTROCARDIOGRAM COMPLETE: CPT | Performed by: INTERNAL MEDICINE

## 2020-09-18 NOTE — PROGRESS NOTES
Cardiology Office Consultation      Encounter Date:  09/18/2020    Patient ID:   Yasir Hector is a 75 y.o. male.    Reason For Consultation:  Chest pain    History of Present Illness:  Dear Han Mcgowan MD    I had the pleasure of seeing Yasir Hector today. As you are well aware, this is a 75 y.o. male with no history of ischemic heart disease.  He does have a history of hypertension, hyperlipidemia, prediabetes, alcohol abuse, and osteoarthritis.  He presents today for the evaluation of chest pain.    He reports for the last 6 months he has had some left-sided chest discomfort when he walks about 1/4 mile.  He describes it as a tightness that is moderate in intensity.  If he stops walking and waits a few minutes it goes away.  This is also accompanied with some shortness of breath.  He reports some radiation of the discomfort into his left jaw.  It occurs almost exclusively with walking.  The discomfort will completely clear in about 5 minutes.    He reports earlier this year he was furloughed because of the pandemic.  His wife was diagnosed with lung cancer and they both started drinking more alcohol.  He feels like he has an issue with this.  He reports that he does not engage in any dangerous activities such as driving a car while intoxicated.  He reports he will consume about a half a bottle to a bottle of wine a day for work.    He reports that he underwent a coronary calcium score examination.  His coronary calcium score was 1025 indicating a high likelihood of at least 1 obstructive coronary stenosis.  This places him at high risk for cardiovascular disease.  We discussed options and in light of his symptoms as well as his coronary calcium score, the will proceed with cardiac catheterization.    Assessment & Plan    Impressions:  Symptom constellation of chest pain with exertion and dyspnea on exertion  Abnormal coronary calcium score suggestive of significant coronary  atherosclerosis  Hypertension  Hyperlipidemia  Alcohol dependency    Recommendations:  Risk-benefit and options discussed.  We will proceed with cardiac catheterization.  He was given tools for alcohol cessation  Follow-up in 4 weeks.    Objective:    Vitals:  Vitals:    09/18/20 1345   BP: 164/83   Pulse: 74   SpO2: 92%   Weight: 112 kg (247 lb)       Physical Exam:    General: Alert, cooperative, no distress, appears stated age  Head:  Normocephalic, atraumatic, mucous membranes moist  Eyes:  Conjunctiva/corneas clear, EOM's intact     Neck:  Supple,  no adenopathy;      Lungs: Clear to auscultation bilaterally, no wheezes rhonchi rales are noted  Chest wall: No tenderness  Heart::  Regular rate and rhythm, S1 and S2 normal, 1/6 holosystolic murmur.  No rub or gallop  Abdomen: Soft, non-tender, nondistended bowel sounds active  Extremities: No cyanosis, clubbing, or edema  Pulses: 2+ and symmetric all extremities  Skin:  No rashes or lesions  Neuro/psych: A&O x3. CN II through XII are grossly intact with appropriate affect    History of Present Illness      Allergies:  No Known Allergies    Medication Review:   No current outpatient medications on file.    Family History:  Family History   Problem Relation Age of Onset   • Cancer Mother         BREAST   • Heart disease Father        Past Medical History:  History reviewed. No pertinent past medical history.    Past surgical History:  History reviewed. No pertinent surgical history.    Social History:  Social History     Socioeconomic History   • Marital status:      Spouse name: Not on file   • Number of children: Not on file   • Years of education: Not on file   • Highest education level: Not on file   Tobacco Use   • Smoking status: Former Smoker     Types: Cigars   • Smokeless tobacco: Never Used   Substance and Sexual Activity   • Alcohol use: Yes       Review of Systems:  The following systems were reviewed as they relate to the cardiovascular system:  Constitutional, Eyes, ENT, Cardiovascular, Respiratory, Gastrointestinal, Integumentary, Neurological, Psychiatric, Hematologic, Endocrine, Musculoskeletal, and Genitourinary. The pertinent cardiovascular findings are reported above with all other cardiovascular points within those systems being negative.    Diagnostic Study Review:     Current Electrocardiogram:    ECG 12 Lead    Date/Time: 9/18/2020 10:59 AM  Performed by: Raul Talbert DO  Authorized by: Raul Talbert DO   Comments: Normal sinus rhythm with a ventricular rate of 69 bpm.  Left atrial enlargement.  Incomplete right bundle branch block.  Low voltage in the precordial leads.  Consider left ventricular hypertrophy.  Normal QT and mildly prolonged QTC intervals of 450 and 482 ms respectively.                NOTE: The following portions of the patient's history were reviewed and updated this visit as appropriate: allergies, current medications, past family history, past medical history, past social history, past surgical history and problem list.

## 2020-09-19 PROBLEM — R93.1 ABNORMAL SCREENING CARDIAC CT: Status: ACTIVE | Noted: 2020-09-19

## 2020-09-19 PROBLEM — R07.89 OTHER CHEST PAIN: Status: ACTIVE | Noted: 2020-09-19

## 2020-09-19 PROBLEM — F10.10 ALCOHOL ABUSE: Status: ACTIVE | Noted: 2020-09-19

## 2020-09-19 RX ORDER — NITROGLYCERIN 0.4 MG/1
0.4 TABLET SUBLINGUAL
Status: CANCELLED | OUTPATIENT
Start: 2020-09-19

## 2020-09-19 RX ORDER — ASPIRIN 81 MG/1
81 TABLET ORAL DAILY
Status: CANCELLED | OUTPATIENT
Start: 2020-09-19

## 2020-09-19 RX ORDER — ONDANSETRON 2 MG/ML
4 INJECTION INTRAMUSCULAR; INTRAVENOUS EVERY 6 HOURS PRN
Status: CANCELLED | OUTPATIENT
Start: 2020-09-19

## 2020-09-19 RX ORDER — DIPHENHYDRAMINE HYDROCHLORIDE 50 MG/ML
50 INJECTION INTRAMUSCULAR; INTRAVENOUS ONCE
Status: CANCELLED | OUTPATIENT
Start: 2020-09-19 | End: 2020-09-19

## 2020-09-19 RX ORDER — HYDROCODONE BITARTRATE AND ACETAMINOPHEN 5; 325 MG/1; MG/1
1 TABLET ORAL EVERY 4 HOURS PRN
Status: CANCELLED | OUTPATIENT
Start: 2020-09-19 | End: 2020-09-29

## 2020-09-19 RX ORDER — ASPIRIN 81 MG/1
324 TABLET, CHEWABLE ORAL ONCE
Status: CANCELLED | OUTPATIENT
Start: 2020-09-19 | End: 2020-09-19

## 2021-08-31 ENCOUNTER — OFFICE VISIT (OUTPATIENT)
Dept: INTERNAL MEDICINE | Facility: CLINIC | Age: 76
End: 2021-08-31

## 2021-08-31 VITALS
WEIGHT: 236.9 LBS | RESPIRATION RATE: 19 BRPM | BODY MASS INDEX: 33.92 KG/M2 | OXYGEN SATURATION: 98 % | TEMPERATURE: 97.3 F | HEART RATE: 64 BPM | SYSTOLIC BLOOD PRESSURE: 138 MMHG | DIASTOLIC BLOOD PRESSURE: 76 MMHG | HEIGHT: 70 IN

## 2021-08-31 DIAGNOSIS — E78.00 HYPERCHOLESTEROLEMIA: ICD-10-CM

## 2021-08-31 DIAGNOSIS — Z95.1 S/P CABG (CORONARY ARTERY BYPASS GRAFT): ICD-10-CM

## 2021-08-31 DIAGNOSIS — Z12.5 SCREENING PSA (PROSTATE SPECIFIC ANTIGEN): ICD-10-CM

## 2021-08-31 DIAGNOSIS — Z12.11 SCREEN FOR COLON CANCER: ICD-10-CM

## 2021-08-31 DIAGNOSIS — I65.23 BILATERAL CAROTID ARTERY STENOSIS: ICD-10-CM

## 2021-08-31 DIAGNOSIS — I10 ESSENTIAL HYPERTENSION: Primary | ICD-10-CM

## 2021-08-31 DIAGNOSIS — R73.03 PREDIABETES: ICD-10-CM

## 2021-08-31 DIAGNOSIS — Z11.59 NEED FOR HEPATITIS C SCREENING TEST: ICD-10-CM

## 2021-08-31 DIAGNOSIS — N50.89 GENITAL LESION, MALE: ICD-10-CM

## 2021-08-31 PROBLEM — I77.9 BILATERAL CAROTID ARTERY DISEASE (HCC): Status: ACTIVE | Noted: 2020-12-29

## 2021-08-31 PROBLEM — R06.09 DYSPNEA ON EXERTION: Status: ACTIVE | Noted: 2020-10-09

## 2021-08-31 PROBLEM — D62 ACUTE BLOOD LOSS ANEMIA: Status: ACTIVE | Noted: 2020-10-21

## 2021-08-31 PROBLEM — R07.89 OTHER CHEST PAIN: Status: RESOLVED | Noted: 2020-09-19 | Resolved: 2021-08-31

## 2021-08-31 PROBLEM — I25.718 CORONARY ARTERY DISEASE OF AUTOLOGOUS VEIN BYPASS GRAFT WITH STABLE ANGINA PECTORIS (HCC): Status: ACTIVE | Noted: 2020-12-29

## 2021-08-31 PROBLEM — I77.9 BILATERAL CAROTID ARTERY DISEASE (HCC): Status: RESOLVED | Noted: 2017-02-21 | Resolved: 2021-08-31

## 2021-08-31 PROCEDURE — 99204 OFFICE O/P NEW MOD 45 MIN: CPT | Performed by: NURSE PRACTITIONER

## 2021-08-31 NOTE — PROGRESS NOTES
"Chief Complaint   Patient presents with   • Establish Care   • Hypertension   • Prediabetes       Subjective     Yasir Hector is a 76 y.o. male being seen for a new [facility] appt for HTN, CAD, prediabetes, and carotid artery disease. He does not want to take nay BP or cholesterol medications. He reports that he was prescribed these after his CABG in October 2020, but stopped these AMA. He is followed by Dr. Bundy for CABG X4. She completed cardiac rehab in Jan 2021.   He has bilateral carotid stenosis, last carotid  10-.    He is reporting that he has multiple several concerns today. He has concern for dementia. 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.      He has a genital lesion for several years to right scrotum. Non painful, not growing.    He is also reporting left sided sinus pressure for months. Associated congestion. Denies fever, cough.    History of Present Illness     No Known Allergies    No current outpatient medications on file.    The following portions of the patient's history were reviewed and updated as appropriate: allergies, current medications, past family history, past medical history, past social history, past surgical history and problem list.    Review of Systems   Constitutional: Negative.  Negative for fatigue and fever.   HENT: Positive for congestion.    Respiratory: Negative.  Negative for shortness of breath and stridor.    Cardiovascular: Negative for chest pain, palpitations and leg swelling.   Skin: Positive for rash (lesion).   Hematological: Negative.  Negative for adenopathy. Does not bruise/bleed easily.   Psychiatric/Behavioral: Negative.    All other systems reviewed and are negative.      Assessment     Physical Exam  Vitals reviewed.   Constitutional:       Appearance: Normal appearance. He is obese. He is not ill-appearing.   HENT:      Head: Normocephalic.      Right Ear: Decreased hearing noted.      Left " "Ear: Decreased hearing noted.      Nose: Nose normal. No congestion or rhinorrhea.   Cardiovascular:      Rate and Rhythm: Normal rate and regular rhythm.      Pulses: Normal pulses.      Heart sounds: Normal heart sounds. No murmur heard.     Pulmonary:      Effort: Pulmonary effort is normal. No respiratory distress.      Breath sounds: Normal breath sounds. No stridor.   Genitourinary:      Musculoskeletal:         General: No swelling.   Skin:     General: Skin is warm and dry.   Neurological:      General: No focal deficit present.      Mental Status: He is alert and oriented to person, place, and time.   Psychiatric:         Mood and Affect: Mood normal.         Behavior: Behavior normal.         Thought Content: Thought content normal.         Plan     His fasting labs were reviewed with the patient from last week.     Diagnoses and all orders for this visit:    1. Essential hypertension (Primary)  -     Comprehensive Metabolic Panel  -     Lipid Panel With / Chol / HDL Ratio    2. Hypercholesterolemia  -     Comprehensive Metabolic Panel  -     Lipid Panel With / Chol / HDL Ratio    3. S/P CABG (coronary artery bypass graft)  -     CBC (No Diff)  -     Comprehensive Metabolic Panel  -     Lipid Panel With / Chol / HDL Ratio    4. Prediabetes  -     CBC (No Diff)  -     Comprehensive Metabolic Panel  -     Hemoglobin A1c    5. Genital lesion, male  -     Ambulatory Referral to Dermatology    6. Need for hepatitis C screening test  -     Hepatitis C Antibody    7. Bilateral carotid artery stenosis  Comments:  He refuses statin therapy, repeat Carotid US in October    8. Screening PSA (prostate specific antigen)  -     PSA Screen    9. Screen for colon cancer  -     Cologuard - Stool, Per Rectum; Future        Reviewed his cardiac risk. Recommend a statin, aspirin and beta blocker therapy. He declines stating \"that is why I left my last doctor, because we got in an argument after she called my cardiologist.\" "     Refer to derm for lesion, probable genital wart    Follow up in 2 weeks with dementia screening

## 2021-09-05 LAB
ALBUMIN SERPL-MCNC: 4.5 G/DL (ref 3.7–4.7)
ALBUMIN/GLOB SERPL: 2 {RATIO} (ref 1.2–2.2)
ALP SERPL-CCNC: 85 IU/L (ref 48–121)
ALT SERPL-CCNC: 17 IU/L (ref 0–44)
AST SERPL-CCNC: 19 IU/L (ref 0–40)
BILIRUB SERPL-MCNC: 0.7 MG/DL (ref 0–1.2)
BUN SERPL-MCNC: 12 MG/DL (ref 8–27)
BUN/CREAT SERPL: 12 (ref 10–24)
CALCIUM SERPL-MCNC: 9.1 MG/DL (ref 8.6–10.2)
CHLORIDE SERPL-SCNC: 102 MMOL/L (ref 96–106)
CHOLEST SERPL-MCNC: 220 MG/DL (ref 100–199)
CHOLEST/HDLC SERPL: 3.5 RATIO (ref 0–5)
CO2 SERPL-SCNC: 26 MMOL/L (ref 20–29)
CREAT SERPL-MCNC: 0.98 MG/DL (ref 0.76–1.27)
ERYTHROCYTE [DISTWIDTH] IN BLOOD BY AUTOMATED COUNT: 14.6 % (ref 11.6–15.4)
GLOBULIN SER CALC-MCNC: 2.3 G/DL (ref 1.5–4.5)
GLUCOSE SERPL-MCNC: 124 MG/DL (ref 65–99)
HBA1C MFR BLD: 6.2 % (ref 4.8–5.6)
HCT VFR BLD AUTO: 41.8 % (ref 37.5–51)
HCV AB S/CO SERPL IA: <0.1 S/CO RATIO (ref 0–0.9)
HDLC SERPL-MCNC: 62 MG/DL
HGB BLD-MCNC: 14.3 G/DL (ref 13–17.7)
LDLC SERPL CALC-MCNC: 142 MG/DL (ref 0–99)
MCH RBC QN AUTO: 30.3 PG (ref 26.6–33)
MCHC RBC AUTO-ENTMCNC: 34.2 G/DL (ref 31.5–35.7)
MCV RBC AUTO: 89 FL (ref 79–97)
PLATELET # BLD AUTO: 211 X10E3/UL (ref 150–450)
POTASSIUM SERPL-SCNC: 5.3 MMOL/L (ref 3.5–5.2)
PROT SERPL-MCNC: 6.8 G/DL (ref 6–8.5)
PSA SERPL-MCNC: 1.3 NG/ML (ref 0–4)
RBC # BLD AUTO: 4.72 X10E6/UL (ref 4.14–5.8)
SODIUM SERPL-SCNC: 142 MMOL/L (ref 134–144)
TRIGL SERPL-MCNC: 88 MG/DL (ref 0–149)
VLDLC SERPL CALC-MCNC: 16 MG/DL (ref 5–40)
WBC # BLD AUTO: 5.2 X10E3/UL (ref 3.4–10.8)

## 2021-09-15 ENCOUNTER — OFFICE VISIT (OUTPATIENT)
Dept: INTERNAL MEDICINE | Facility: CLINIC | Age: 76
End: 2021-09-15

## 2021-09-15 VITALS
DIASTOLIC BLOOD PRESSURE: 82 MMHG | HEIGHT: 70 IN | WEIGHT: 234.4 LBS | HEART RATE: 68 BPM | RESPIRATION RATE: 16 BRPM | SYSTOLIC BLOOD PRESSURE: 143 MMHG | OXYGEN SATURATION: 99 % | TEMPERATURE: 97.7 F | BODY MASS INDEX: 33.56 KG/M2

## 2021-09-15 DIAGNOSIS — R73.03 PREDIABETES: ICD-10-CM

## 2021-09-15 DIAGNOSIS — I25.718 CORONARY ARTERY DISEASE OF AUTOLOGOUS VEIN BYPASS GRAFT WITH STABLE ANGINA PECTORIS (HCC): ICD-10-CM

## 2021-09-15 DIAGNOSIS — R41.3 GRADUAL-ONSET MEMORY IMPAIRMENT: ICD-10-CM

## 2021-09-15 DIAGNOSIS — E78.00 HYPERCHOLESTEROLEMIA: ICD-10-CM

## 2021-09-15 DIAGNOSIS — I10 ESSENTIAL HYPERTENSION: Primary | ICD-10-CM

## 2021-09-15 DIAGNOSIS — N40.1 BENIGN PROSTATIC HYPERPLASIA WITH POST-VOID DRIBBLING: ICD-10-CM

## 2021-09-15 DIAGNOSIS — N39.43 BENIGN PROSTATIC HYPERPLASIA WITH POST-VOID DRIBBLING: ICD-10-CM

## 2021-09-15 PROCEDURE — 99214 OFFICE O/P EST MOD 30 MIN: CPT | Performed by: NURSE PRACTITIONER

## 2021-09-15 RX ORDER — L-MEFOL/A-CYST/MEB12/ALGAL OIL 6-600-2 MG
1 TABLET ORAL DAILY
Qty: 90 TABLET | Refills: 1 | Status: SHIPPED | OUTPATIENT
Start: 2021-09-15

## 2021-09-15 NOTE — PROGRESS NOTES
"Chief Complaint   Patient presents with   • Hypertension   • Labs Only     follow up on blood work        Subjective     Yasir Hector is a 76 y.o. male being seen for a follow up appointment today regarding HTN, Prediabetes, and CAD.HE was a new patient here 2 weeks ago. He does not want to take nay BP or cholesterol medications. He reports that he was prescribed these after his CABG in October 2020, but stopped these AMA. He is followed by Dr. Bundy for CABG X4, next appt 12-. He completed cardiac rehab in Jan 2021.He has bilateral carotid stenosis, last carotid  10-. He is walking 1.5 miles a day and going to [employer] 2 times a week. He denies CP, visual loss, SOA, swelling in legs.     He is reporting that he has multiple several concerns today. He has concern for dementia. 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       He has a genital lesion for several years to right scrotum. Non painful, not growing. He was referred to dermatology at last visit.      He is reporting \"dribbling\", weak urine stream. No blood in urine.     History of Present Illness     Allergies   Allergen Reactions   • Tramadol Other (See Comments)     Headache       No current outpatient medications on file.    The following portions of the patient's history were reviewed and updated as appropriate: allergies, current medications, past family history, past medical history, past social history, past surgical history and problem list.    Review of Systems   Constitutional: Negative.    HENT: Negative.    Eyes: Negative.    Respiratory: Negative.  Negative for shortness of breath, wheezing and stridor.    Cardiovascular: Negative.  Negative for chest pain, palpitations and leg swelling.   Gastrointestinal: Negative.    Endocrine: Negative.    Genitourinary: Negative.    Musculoskeletal: Negative.    Skin: Positive for rash (genital lesion).   Allergic/Immunologic: " Negative.    Neurological: Negative.    Hematological: Negative.  Negative for adenopathy. Does not bruise/bleed easily.   Psychiatric/Behavioral: The patient is not nervous/anxious.    All other systems reviewed and are negative.      Assessment     Physical Exam  HENT:      Head: Normocephalic.      Right Ear: Tympanic membrane normal.      Left Ear: Tympanic membrane normal.      Nose: Nose normal.   Cardiovascular:      Rate and Rhythm: Normal rate and regular rhythm.      Pulses: Normal pulses.      Heart sounds: Normal heart sounds. No murmur heard.     Pulmonary:      Effort: Pulmonary effort is normal. No respiratory distress.      Breath sounds: Normal breath sounds. No stridor.   Musculoskeletal:         General: No swelling.      Cervical back: Neck supple.      Right lower leg: No edema.      Left lower leg: No edema.   Skin:     General: Skin is warm and dry.   Neurological:      General: No focal deficit present.      Mental Status: He is alert and oriented to person, place, and time.      Comments: MMSE 29   Psychiatric:         Mood and Affect: Mood normal.         Behavior: Behavior normal.         Plan     His fasting labs were reviewed with the patient from last week.     Diagnoses and all orders for this visit:    1. Essential hypertension (Primary)  Comments:  BP not at goal. Refuses Beta blocker therapy    2. Hypercholesterolemia  Comments:  LDL not at goal. Cardiac risk very high, refuses Statin therapy despite risk/benefit in statin favor    3. 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 Prediabetes    5. Benign prostatic hyperplasia with post-void dribbling  Comments:  Discussed Flomax as a therapy, he declines. Avoid bladder irritants, ie caffeine and alcohol    6. Gradual-onset memory impairment  Comments:  MMSE 29 today. Discussed age related atrophy and arteriosclerosis.      Other orders  -      "Methylfol-Algae-[id number]-[id number] (Cerefolin NAC) 6-90.314-2-600 MG tablet; Take 1 tablet by mouth Daily.  Dispense: 90 tablet; Refill: 1        Reviewed his cardiac risk. Recommend a statin, aspirin and beta blocker therapy. He declines stating \"that is why I left my last doctor, because we got in an argument after she called my cardiologist.\" He will discuss with Dr. Bundy as well.     The 10-year ASCVD risk score (Chapis DC Jr., et al., 2013) is: 31.7%    Values used to calculate the score:      Age: 76 years      Sex: Male      Is Non- : No      Diabetic: No      Tobacco smoker: No      Systolic Blood Pressure: 143 mmHg      Is BP treated: No      HDL Cholesterol: 62 mg/dL      Total Cholesterol: 220 mg/dL     Set up an AWV, due to declining medical intervention as stated above. No need for medication management in 6 months. Follow up with allison  "